# Patient Record
Sex: FEMALE | Race: AMERICAN INDIAN OR ALASKA NATIVE
[De-identification: names, ages, dates, MRNs, and addresses within clinical notes are randomized per-mention and may not be internally consistent; named-entity substitution may affect disease eponyms.]

---

## 2018-02-14 ENCOUNTER — HOSPITAL ENCOUNTER (OUTPATIENT)
Dept: HOSPITAL 5 - LABHHL | Age: 48
Discharge: HOME | End: 2018-02-14
Attending: SPECIALIST
Payer: COMMERCIAL

## 2018-02-14 DIAGNOSIS — N60.02: Primary | ICD-10-CM

## 2018-02-14 PROCEDURE — 88112 CYTOPATH CELL ENHANCE TECH: CPT

## 2020-11-23 ENCOUNTER — OFFICE VISIT (OUTPATIENT)
Dept: URBAN - METROPOLITAN AREA CLINIC 94 | Facility: CLINIC | Age: 50
End: 2020-11-23
Payer: COMMERCIAL

## 2020-11-23 VITALS
HEART RATE: 71 BPM | TEMPERATURE: 97.1 F | WEIGHT: 185 LBS | BODY MASS INDEX: 32.78 KG/M2 | SYSTOLIC BLOOD PRESSURE: 163 MMHG | DIASTOLIC BLOOD PRESSURE: 85 MMHG | HEIGHT: 63 IN

## 2020-11-23 DIAGNOSIS — Z12.11 COLON CANCER SCREENING: ICD-10-CM

## 2020-11-23 DIAGNOSIS — R10.9 ABDOMINAL PAIN, UNSPECIFIED ABDOMINAL LOCATION: ICD-10-CM

## 2020-11-23 DIAGNOSIS — K59.00 CONSTIPATION, UNSPECIFIED CONSTIPATION TYPE: ICD-10-CM

## 2020-11-23 PROCEDURE — G8427 DOCREV CUR MEDS BY ELIG CLIN: HCPCS | Performed by: INTERNAL MEDICINE

## 2020-11-23 PROCEDURE — 1036F TOBACCO NON-USER: CPT | Performed by: INTERNAL MEDICINE

## 2020-11-23 PROCEDURE — 99204 OFFICE O/P NEW MOD 45 MIN: CPT | Performed by: INTERNAL MEDICINE

## 2020-11-23 PROCEDURE — 3017F COLORECTAL CA SCREEN DOC REV: CPT | Performed by: INTERNAL MEDICINE

## 2020-11-23 RX ORDER — MULTIVIT-MIN/IRON/FOLIC ACID/K 18-600-40
AS DIRECTED CAPSULE ORAL
Status: ACTIVE | COMMUNITY

## 2020-11-23 RX ORDER — BRIMONIDINE TARTRATE 2 MG/ML
1 DROP INTO AFFECTED EYE SOLUTION/ DROPS OPHTHALMIC
Status: ACTIVE | COMMUNITY

## 2020-11-23 RX ORDER — THYROID 60 MG/1
1 TABLET ON AN EMPTY STOMACH TABLET ORAL ONCE A DAY
Status: ACTIVE | COMMUNITY

## 2020-11-23 RX ORDER — ASPIRIN 325 MG
1 CAPSULE TABLET ORAL ONCE A DAY
Status: ACTIVE | COMMUNITY

## 2020-11-23 NOTE — HPI-TODAY'S VISIT:
51 y/o with hx of partial colectomy(for constipation?), hysterectomy, hypothyroidism(on levothyroxine) here for constipation and abdominal pain  Patient reports intermittent non-specific abdominal cramping pain with associated constipation, with symptoms that improve with BM. Has taken linzess recently with some improvement. Does report she had a partial colectomy over 15 years ago for constipation as 'some muscles in the colon wall were not working properly'. Since then did well, until her constipation returned. Does report last colonoscopy over 10 years ago  CT a/p 2016: no acute findings  Denies any blood in stools, nausea/vomiting, diarrhea, family hx of colon cancer

## 2020-12-30 ENCOUNTER — OFFICE VISIT (OUTPATIENT)
Dept: URBAN - METROPOLITAN AREA SURGERY CENTER 17 | Facility: SURGERY CENTER | Age: 50
End: 2020-12-30
Payer: COMMERCIAL

## 2020-12-30 ENCOUNTER — CLAIMS CREATED FROM THE CLAIM WINDOW (OUTPATIENT)
Dept: URBAN - METROPOLITAN AREA CLINIC 4 | Facility: CLINIC | Age: 50
End: 2020-12-30
Payer: COMMERCIAL

## 2020-12-30 DIAGNOSIS — K63.5 BENIGN COLON POLYP: ICD-10-CM

## 2020-12-30 DIAGNOSIS — Z12.11 COLON CANCER SCREENING: ICD-10-CM

## 2020-12-30 DIAGNOSIS — K63.89 OTHER SPECIFIED DISEASES OF INTESTINE: ICD-10-CM

## 2020-12-30 PROCEDURE — 45380 COLONOSCOPY AND BIOPSY: CPT | Performed by: INTERNAL MEDICINE

## 2020-12-30 PROCEDURE — G9933 CANC DETECTD DURING COL SCRN: HCPCS | Performed by: INTERNAL MEDICINE

## 2020-12-30 PROCEDURE — 88305 TISSUE EXAM BY PATHOLOGIST: CPT | Performed by: PATHOLOGY

## 2020-12-30 PROCEDURE — G8907 PT DOC NO EVENTS ON DISCHARG: HCPCS | Performed by: INTERNAL MEDICINE

## 2020-12-30 RX ORDER — BRIMONIDINE TARTRATE 2 MG/ML
1 DROP INTO AFFECTED EYE SOLUTION/ DROPS OPHTHALMIC
Status: ACTIVE | COMMUNITY

## 2020-12-30 RX ORDER — ASPIRIN 325 MG
1 CAPSULE TABLET ORAL ONCE A DAY
Status: ACTIVE | COMMUNITY

## 2020-12-30 RX ORDER — THYROID 60 MG/1
1 TABLET ON AN EMPTY STOMACH TABLET ORAL ONCE A DAY
Status: ACTIVE | COMMUNITY

## 2020-12-30 RX ORDER — MULTIVIT-MIN/IRON/FOLIC ACID/K 18-600-40
AS DIRECTED CAPSULE ORAL
Status: ACTIVE | COMMUNITY

## 2021-01-21 ENCOUNTER — OFFICE VISIT (OUTPATIENT)
Dept: URBAN - METROPOLITAN AREA CLINIC 94 | Facility: CLINIC | Age: 51
End: 2021-01-21
Payer: COMMERCIAL

## 2021-01-21 ENCOUNTER — WEB ENCOUNTER (OUTPATIENT)
Dept: URBAN - METROPOLITAN AREA CLINIC 94 | Facility: CLINIC | Age: 51
End: 2021-01-21

## 2021-01-21 VITALS
WEIGHT: 189 LBS | TEMPERATURE: 97.3 F | DIASTOLIC BLOOD PRESSURE: 78 MMHG | BODY MASS INDEX: 33.49 KG/M2 | SYSTOLIC BLOOD PRESSURE: 155 MMHG | HEIGHT: 63 IN | HEART RATE: 79 BPM

## 2021-01-21 DIAGNOSIS — R10.9 ABDOMINAL PAIN, UNSPECIFIED ABDOMINAL LOCATION: ICD-10-CM

## 2021-01-21 DIAGNOSIS — K59.00 CONSTIPATION, UNSPECIFIED CONSTIPATION TYPE: ICD-10-CM

## 2021-01-21 PROCEDURE — 99213 OFFICE O/P EST LOW 20 MIN: CPT | Performed by: INTERNAL MEDICINE

## 2021-01-21 PROCEDURE — G8427 DOCREV CUR MEDS BY ELIG CLIN: HCPCS | Performed by: INTERNAL MEDICINE

## 2021-01-21 PROCEDURE — G8482 FLU IMMUNIZE ORDER/ADMIN: HCPCS | Performed by: INTERNAL MEDICINE

## 2021-01-21 PROCEDURE — 3017F COLORECTAL CA SCREEN DOC REV: CPT | Performed by: INTERNAL MEDICINE

## 2021-01-21 RX ORDER — MULTIVIT-MIN/IRON/FOLIC ACID/K 18-600-40
AS DIRECTED CAPSULE ORAL
Status: ACTIVE | COMMUNITY

## 2021-01-21 RX ORDER — THYROID 60 MG/1
1 TABLET ON AN EMPTY STOMACH TABLET ORAL ONCE A DAY
Status: ACTIVE | COMMUNITY

## 2021-01-21 RX ORDER — ASPIRIN 325 MG
1 CAPSULE TABLET ORAL ONCE A DAY
Status: ACTIVE | COMMUNITY

## 2021-01-21 RX ORDER — BRIMONIDINE TARTRATE 2 MG/ML
1 DROP INTO AFFECTED EYE SOLUTION/ DROPS OPHTHALMIC
Status: ACTIVE | COMMUNITY

## 2021-01-21 NOTE — HPI-TODAY'S VISIT:
51 y/o with hx of partial colectomy(for constipation per patient), hypothyroidism(on levothyroxine) here for f/u of constipation and abdominal pain  Patient with intermittent non-specific abdominal cramping pain with associated constipation, with symptoms that improve with BM. Does report she had a partial colectomy over 15 years ago for constipation as 'some muscles in the colon wall were not working properly'.  Started on linzess, miralax, fiber. Underwent colonoscopy last month unremarkable other than colo-colonic anastomosis  Today reports almost near resolution of her symptoms on the daily miralax, fiber, prunes and linzess. Has a daily BM sometimes soft, somtimes firm but no straining  CT a/p 2016: no acute findings

## 2021-05-28 ENCOUNTER — OFFICE VISIT (OUTPATIENT)
Dept: URBAN - METROPOLITAN AREA CLINIC 94 | Facility: CLINIC | Age: 51
End: 2021-05-28

## 2021-06-01 ENCOUNTER — OFFICE VISIT (OUTPATIENT)
Dept: URBAN - METROPOLITAN AREA CLINIC 94 | Facility: CLINIC | Age: 51
End: 2021-06-01
Payer: COMMERCIAL

## 2021-06-01 VITALS
BODY MASS INDEX: 33.66 KG/M2 | HEIGHT: 63 IN | DIASTOLIC BLOOD PRESSURE: 85 MMHG | SYSTOLIC BLOOD PRESSURE: 166 MMHG | HEART RATE: 72 BPM | WEIGHT: 190 LBS | TEMPERATURE: 97.5 F

## 2021-06-01 DIAGNOSIS — K59.00 CONSTIPATION, UNSPECIFIED CONSTIPATION TYPE: ICD-10-CM

## 2021-06-01 DIAGNOSIS — R06.02 SOB (SHORTNESS OF BREATH): ICD-10-CM

## 2021-06-01 DIAGNOSIS — R07.9 CHEST PAIN AT REST: ICD-10-CM

## 2021-06-01 PROCEDURE — 99214 OFFICE O/P EST MOD 30 MIN: CPT | Performed by: INTERNAL MEDICINE

## 2021-06-01 RX ORDER — BRIMONIDINE TARTRATE 2 MG/ML
1 DROP INTO AFFECTED EYE SOLUTION/ DROPS OPHTHALMIC
Status: ACTIVE | COMMUNITY

## 2021-06-01 RX ORDER — ALUMINUM HYDROXIDE AND MAGNESIUM CARBONATE 95; 358 MG/15ML; MG/15ML
15 ML AFTER MEALS AND AT BEDTIME AS NEEDED LIQUID ORAL
Qty: 1800 ML | Refills: 2 | OUTPATIENT
Start: 2021-06-01 | End: 2021-08-30

## 2021-06-01 RX ORDER — THYROID 60 MG/1
1 TABLET ON AN EMPTY STOMACH TABLET ORAL ONCE A DAY
Status: ACTIVE | COMMUNITY

## 2021-06-01 RX ORDER — OMEPRAZOLE 40 MG/1
1 CAPSULE 30 MINUTES BEFORE MORNING MEAL CAPSULE, DELAYED RELEASE ORAL ONCE A DAY
Qty: 60 | Refills: 2 | OUTPATIENT
Start: 2021-06-01

## 2021-06-01 RX ORDER — MULTIVIT-MIN/IRON/FOLIC ACID/K 18-600-40
AS DIRECTED CAPSULE ORAL
Status: ON HOLD | COMMUNITY

## 2021-06-01 RX ORDER — ASPIRIN 325 MG
1 CAPSULE TABLET ORAL ONCE A DAY
Status: ON HOLD | COMMUNITY

## 2021-06-01 NOTE — PHYSICAL EXAM CHEST:
no lesions, mild lower chest tenderness, no traumatic injuries,  no significant scars are present,  chest wall non-tender,  no masses present, breathing is unlabored without accessory muscle use,normal breath sounds

## 2021-06-01 NOTE — HPI-TODAY'S VISIT:
49 y/o with hx of partial colectomy(for constipation per patient), hypothyroidism(on levothyroxine) here for chest pressure, SOB  Patient reports was doing well however since April has had 5 episodes of chest pressure/pain, lasting an hour. Has SOB almost daily whenver she exerts herself. Occasional cough. Worried about a heartattack. Saw PCP with EKG nromal. Referred to see cardiac with appointment in next week. Came in today incase if it's GI related  Denies any recent viral illness, heartburn, dysphagia, abdominal pain, weight loss  Previiousy seen for constipation, with colonoscopy 12/2020 unremarkable other than colo-colonic anastomosis.   Well controlled on linzess, miralax, fiber with resolution of symptoms.   CT a/p 2016: no acute findings

## 2021-07-02 ENCOUNTER — OFFICE VISIT (OUTPATIENT)
Dept: URBAN - METROPOLITAN AREA CLINIC 94 | Facility: CLINIC | Age: 51
End: 2021-07-02

## 2021-07-02 ENCOUNTER — OFFICE VISIT (OUTPATIENT)
Dept: URBAN - METROPOLITAN AREA CLINIC 94 | Facility: CLINIC | Age: 51
End: 2021-07-02
Payer: COMMERCIAL

## 2021-07-02 VITALS
HEIGHT: 63 IN | HEART RATE: 59 BPM | TEMPERATURE: 97.7 F | DIASTOLIC BLOOD PRESSURE: 83 MMHG | SYSTOLIC BLOOD PRESSURE: 147 MMHG | BODY MASS INDEX: 33.66 KG/M2 | WEIGHT: 190 LBS

## 2021-07-02 DIAGNOSIS — K59.00 CONSTIPATION, UNSPECIFIED CONSTIPATION TYPE: ICD-10-CM

## 2021-07-02 DIAGNOSIS — R07.9 CHEST PAIN AT REST: ICD-10-CM

## 2021-07-02 DIAGNOSIS — R06.02 SOB (SHORTNESS OF BREATH): ICD-10-CM

## 2021-07-02 PROCEDURE — 99214 OFFICE O/P EST MOD 30 MIN: CPT | Performed by: INTERNAL MEDICINE

## 2021-07-02 RX ORDER — ASPIRIN 325 MG
1 CAPSULE TABLET ORAL ONCE A DAY
Status: ON HOLD | COMMUNITY

## 2021-07-02 RX ORDER — OMEPRAZOLE 40 MG/1
1 CAPSULE 30 MINUTES BEFORE MORNING MEAL CAPSULE, DELAYED RELEASE ORAL ONCE A DAY
Qty: 60 | Refills: 2 | Status: ACTIVE | COMMUNITY
Start: 2021-06-01

## 2021-07-02 RX ORDER — THYROID 60 MG/1
1 TABLET ON AN EMPTY STOMACH TABLET ORAL ONCE A DAY
Status: ACTIVE | COMMUNITY

## 2021-07-02 RX ORDER — BRIMONIDINE TARTRATE 2 MG/ML
1 DROP INTO AFFECTED EYE SOLUTION/ DROPS OPHTHALMIC
Status: ACTIVE | COMMUNITY

## 2021-07-02 RX ORDER — MULTIVIT-MIN/IRON/FOLIC ACID/K 18-600-40
AS DIRECTED CAPSULE ORAL
Status: ON HOLD | COMMUNITY

## 2021-07-02 RX ORDER — ALUMINUM HYDROXIDE AND MAGNESIUM CARBONATE 95; 358 MG/15ML; MG/15ML
15 ML AFTER MEALS AND AT BEDTIME AS NEEDED LIQUID ORAL
Qty: 1800 ML | Refills: 2 | Status: ACTIVE | COMMUNITY
Start: 2021-06-01 | End: 2021-08-30

## 2021-07-02 NOTE — HPI-TODAY'S VISIT:
51 y/o with hx of partial colectomy(for constipation per patient), hypothyroidism(on levothyroxine) here for f/u of chest pressure, SOB  Patient reports was doing well however since April has had multiple episodes of chest pressure/pain, lasting an hour. Has SOB almost daily whenver she exerts herself. Occasional cough. EKG at PCP normal. Her cards since last visit, and is waiting for results of her stress test and TTE  Still has symptoms. No difference with PPI and antacoid.   Denies any recent viral illness, heartburn, dysphagia, abdominal pain, weight loss  Previiousy seen for constipation, with colonoscopy 12/2020 unremarkable other than colo-colonic anastomosis.   Well controlled on linzess, miralax, fiber with resolution of symptoms.  CT a/p 2016: no acute findings

## 2021-07-12 ENCOUNTER — OFFICE VISIT (OUTPATIENT)
Dept: URBAN - METROPOLITAN AREA SURGERY CENTER 17 | Facility: SURGERY CENTER | Age: 51
End: 2021-07-12
Payer: COMMERCIAL

## 2021-07-12 ENCOUNTER — CLAIMS CREATED FROM THE CLAIM WINDOW (OUTPATIENT)
Dept: URBAN - METROPOLITAN AREA CLINIC 4 | Facility: CLINIC | Age: 51
End: 2021-07-12
Payer: COMMERCIAL

## 2021-07-12 DIAGNOSIS — K21.9 GASTRO-ESOPHAGEAL REFLUX DISEASE WITHOUT ESOPHAGITIS: ICD-10-CM

## 2021-07-12 DIAGNOSIS — K31.89 GASTRIC FOVEOLAR HYPERPLASIA: ICD-10-CM

## 2021-07-12 DIAGNOSIS — K21.9 ACID REFLUX: ICD-10-CM

## 2021-07-12 DIAGNOSIS — K31.89 ACQUIRED DEFORMITY OF DUODENUM: ICD-10-CM

## 2021-07-12 PROCEDURE — 88312 SPECIAL STAINS GROUP 1: CPT | Performed by: PATHOLOGY

## 2021-07-12 PROCEDURE — 88305 TISSUE EXAM BY PATHOLOGIST: CPT | Performed by: PATHOLOGY

## 2021-07-12 PROCEDURE — G8907 PT DOC NO EVENTS ON DISCHARG: HCPCS | Performed by: INTERNAL MEDICINE

## 2021-07-12 PROCEDURE — 43239 EGD BIOPSY SINGLE/MULTIPLE: CPT | Performed by: INTERNAL MEDICINE

## 2021-07-12 RX ORDER — ASPIRIN 325 MG
1 CAPSULE TABLET ORAL ONCE A DAY
Status: ON HOLD | COMMUNITY

## 2021-07-12 RX ORDER — MULTIVIT-MIN/IRON/FOLIC ACID/K 18-600-40
AS DIRECTED CAPSULE ORAL
Status: ON HOLD | COMMUNITY

## 2021-07-12 RX ORDER — ALUMINUM HYDROXIDE AND MAGNESIUM CARBONATE 95; 358 MG/15ML; MG/15ML
15 ML AFTER MEALS AND AT BEDTIME AS NEEDED LIQUID ORAL
Qty: 1800 ML | Refills: 2 | Status: ACTIVE | COMMUNITY
Start: 2021-06-01 | End: 2021-08-30

## 2021-07-12 RX ORDER — THYROID 60 MG/1
1 TABLET ON AN EMPTY STOMACH TABLET ORAL ONCE A DAY
Status: ACTIVE | COMMUNITY

## 2021-07-12 RX ORDER — BRIMONIDINE TARTRATE 2 MG/ML
1 DROP INTO AFFECTED EYE SOLUTION/ DROPS OPHTHALMIC
Status: ACTIVE | COMMUNITY

## 2021-07-12 RX ORDER — OMEPRAZOLE 40 MG/1
1 CAPSULE 30 MINUTES BEFORE MORNING MEAL CAPSULE, DELAYED RELEASE ORAL ONCE A DAY
Qty: 60 | Refills: 2 | Status: ACTIVE | COMMUNITY
Start: 2021-06-01

## 2021-07-27 ENCOUNTER — OFFICE VISIT (OUTPATIENT)
Dept: URBAN - METROPOLITAN AREA CLINIC 94 | Facility: CLINIC | Age: 51
End: 2021-07-27
Payer: COMMERCIAL

## 2021-07-27 VITALS
SYSTOLIC BLOOD PRESSURE: 125 MMHG | DIASTOLIC BLOOD PRESSURE: 80 MMHG | BODY MASS INDEX: 33.49 KG/M2 | HEIGHT: 63 IN | HEART RATE: 82 BPM | TEMPERATURE: 98.1 F | WEIGHT: 189 LBS

## 2021-07-27 DIAGNOSIS — R06.02 SOB (SHORTNESS OF BREATH): ICD-10-CM

## 2021-07-27 DIAGNOSIS — K59.00 CONSTIPATION, UNSPECIFIED CONSTIPATION TYPE: ICD-10-CM

## 2021-07-27 PROBLEM — 14760008: Status: ACTIVE | Noted: 2020-11-23

## 2021-07-27 PROCEDURE — 99214 OFFICE O/P EST MOD 30 MIN: CPT | Performed by: INTERNAL MEDICINE

## 2021-07-27 RX ORDER — BRIMONIDINE TARTRATE 2 MG/ML
1 DROP INTO AFFECTED EYE SOLUTION/ DROPS OPHTHALMIC
Status: ACTIVE | COMMUNITY

## 2021-07-27 RX ORDER — OMEPRAZOLE 40 MG/1
1 CAPSULE 30 MINUTES BEFORE MORNING MEAL CAPSULE, DELAYED RELEASE ORAL ONCE A DAY
Qty: 60 | Refills: 2 | Status: ACTIVE | COMMUNITY
Start: 2021-06-01

## 2021-07-27 RX ORDER — MULTIVIT-MIN/IRON/FOLIC ACID/K 18-600-40
AS DIRECTED CAPSULE ORAL
Status: ON HOLD | COMMUNITY

## 2021-07-27 RX ORDER — THYROID 60 MG/1
1 TABLET ON AN EMPTY STOMACH TABLET ORAL ONCE A DAY
Status: ACTIVE | COMMUNITY

## 2021-07-27 RX ORDER — ASPIRIN 325 MG
1 CAPSULE TABLET ORAL ONCE A DAY
Status: ON HOLD | COMMUNITY

## 2021-07-27 RX ORDER — ALUMINUM HYDROXIDE AND MAGNESIUM CARBONATE 95; 358 MG/15ML; MG/15ML
15 ML AFTER MEALS AND AT BEDTIME AS NEEDED LIQUID ORAL
Qty: 1800 ML | Refills: 2 | Status: ON HOLD | COMMUNITY
Start: 2021-06-01 | End: 2021-08-30

## 2021-07-27 NOTE — HPI-TODAY'S VISIT:
49 y/o with hx of partial colectomy(for constipation per patient), hypothyroidism(on levothyroxine) here for f/u of chest pressure, SOB after recent EGD  Patient was doing well however since April has had multiple episodes of chest pressure/pain, lasting an hour. Has SOB almost daily whenver she exerts herself. Occasional cough. EKG at PCP normal, and stress test and TTE normal with cardiology(per patient). Started on omeprazole. EGD this July unremarkable other than reflux esophagitis on biopsy.   Today reports has been chest pain free for over 2 weeks, though still has the daily SOB on exertion. Is taking daily omeprazole. Is not sure if it helps  Is supposed to get a BM biopsy for thrombocytopenia, leukopenia  Previously seen for constipation, with colonoscopy 12/2020 unremarkable other than colo-colonic anastomosis.   Well controlled on linzess, miralax, fiber with resolution of symptoms.  EGD 07/2021: Normal. Biopsy with reflux esophagitis. No h.pylori CLS 12/2020: [place]-colonic anastomosis otherwise normal CT a/p 2016: no acute findings

## 2024-04-30 ENCOUNTER — OV NP (OUTPATIENT)
Dept: URBAN - METROPOLITAN AREA CLINIC 94 | Facility: CLINIC | Age: 54
End: 2024-04-30

## 2024-04-30 ENCOUNTER — LAB (OUTPATIENT)
Dept: URBAN - METROPOLITAN AREA CLINIC 94 | Facility: CLINIC | Age: 54
End: 2024-04-30

## 2024-04-30 VITALS
SYSTOLIC BLOOD PRESSURE: 138 MMHG | WEIGHT: 178 LBS | TEMPERATURE: 97 F | HEART RATE: 72 BPM | DIASTOLIC BLOOD PRESSURE: 83 MMHG | HEIGHT: 63 IN | BODY MASS INDEX: 31.54 KG/M2

## 2024-04-30 RX ORDER — POLYETHYLENE GLYCOL-3350 AND ELECTROLYTES WITH FLAVOR PACK 240; 5.84; 2.98; 6.72; 22.72 G/278.26G; G/278.26G; G/278.26G; G/278.26G; G/278.26G
4000 MILLILITERS POWDER, FOR SOLUTION ORAL
Qty: 4000 MILLILITER | Refills: 0 | OUTPATIENT
Start: 2024-04-30 | End: 2024-05-01

## 2024-04-30 RX ORDER — BRIMONIDINE TARTRATE 2 MG/MG
1 DROP INTO AFFECTED EYE SOLUTION/ DROPS OPHTHALMIC
Status: ACTIVE | COMMUNITY

## 2024-04-30 RX ORDER — SEMAGLUTIDE 1.7 MG/.75ML
0.75 ML INJECTION, SOLUTION SUBCUTANEOUS
Status: ACTIVE | COMMUNITY

## 2024-04-30 RX ORDER — THYROID 60 MG/1
1 TABLET ON AN EMPTY STOMACH TABLET ORAL ONCE A DAY
Status: ACTIVE | COMMUNITY

## 2024-04-30 RX ORDER — LATANOPROST 50 UG/ML
1 DROP INTO AFFECTED EYE IN THE EVENING SOLUTION/ DROPS OPHTHALMIC ONCE A DAY
Status: ACTIVE | COMMUNITY

## 2024-04-30 RX ORDER — ASPIRIN 325 MG
1 CAPSULE TABLET ORAL ONCE A DAY
Status: ACTIVE | COMMUNITY

## 2024-04-30 RX ORDER — LINACLOTIDE 290 UG/1
1 CAPSULE AT LEAST 30 MINUTES BEFORE THE FIRST MEAL OF THE DAY ON AN EMPTY STOMACH CAPSULE, GELATIN COATED ORAL ONCE A DAY
Status: ACTIVE | COMMUNITY

## 2024-04-30 NOTE — HPI-TODAY'S VISIT:
55 y/o F hx of partial colectomy 2/2 chronic constipation presents for eval of abd pain, n/v, diarrhea.   Reports apprx 6 weeks ago she has experienced change in bowel habit accompanied by n/v. Prev struggled with chronic constipation well responsive to linzess but now experiences diarrhea with fecal incontinence, urgency. Reports stools are either watery or loose with no recent formed stools. Denies melena, hematochezia, mucus in stools. Is still currently taking Wegovy (June 2023) and Linzess 290. Nausea is daily with vomiting apprx 2x/wk. Has experienced occasional reflux, denies dysphagia, epigastric pain. Attempted dietary modifications with little sx relief.   No fever, chills, No recent sick contacts, abx use.   CT a/p 04/2024: ? of minimal focal wall thickening or inflammatory change in the sigmoid colon versus incomplete distention. Prior near-total colectomy Tiny anterior abdominal wall hernia containing a portion of nonobstructed small bowel, just lateral to the umbilicus with a small umbilical hernia containing only fat EGD 07/2021: Normal. Biopsy with reflux esophagitis. No h.pylori CLS 12/2020: [place]-colonic anastomosis otherwise normal CT a/p 2016: no acute findings

## 2024-05-15 ENCOUNTER — LAB OUTSIDE AN ENCOUNTER (OUTPATIENT)
Dept: URBAN - METROPOLITAN AREA CLINIC 94 | Facility: CLINIC | Age: 54
End: 2024-05-15

## 2024-05-16 ENCOUNTER — LAB OUTSIDE AN ENCOUNTER (OUTPATIENT)
Dept: URBAN - METROPOLITAN AREA CLINIC 94 | Facility: CLINIC | Age: 54
End: 2024-05-16

## 2024-05-16 ENCOUNTER — TELEPHONE ENCOUNTER (OUTPATIENT)
Dept: URBAN - METROPOLITAN AREA CLINIC 94 | Facility: CLINIC | Age: 54
End: 2024-05-16

## 2024-05-21 ENCOUNTER — TELEPHONE ENCOUNTER (OUTPATIENT)
Dept: URBAN - METROPOLITAN AREA CLINIC 94 | Facility: CLINIC | Age: 54
End: 2024-05-21

## 2024-05-21 LAB
ADENOVIRUS F 40/41: NOT DETECTED
C. DIFFICILE TOXIN A/B, STOOL - QDX: NEGATIVE
CAMPYLOBACTER: NOT DETECTED
CLOSTRIDIUM DIFFICILE: DETECTED
ENTAMOEBA HISTOLYTICA: NOT DETECTED
ENTEROAGGREGATIVE E.COLI: NOT DETECTED
ENTEROTOXIGENIC E.COLI: NOT DETECTED
ESCHERICHIA COLI O157: NOT DETECTED
GIARDIA LAMBLIA: NOT DETECTED
NOROVIRUS GI/GII: NOT DETECTED
ROTAVIRUS A: NOT DETECTED
SALMONELLA SPP.: NOT DETECTED
SHIGA-LIKE TOXIN PRODUCING E.COLI: NOT DETECTED
SHIGELLA SPP. / ENTEROINVASIVE E.COLI: NOT DETECTED
VIBRIO PARAHAEMOLYTICUS: NOT DETECTED
VIBRIO SPP.: NOT DETECTED
YERSINIA ENTEROCOLITICA: NOT DETECTED

## 2024-05-22 ENCOUNTER — TELEPHONE ENCOUNTER (OUTPATIENT)
Dept: URBAN - METROPOLITAN AREA CLINIC 94 | Facility: CLINIC | Age: 54
End: 2024-05-22

## 2024-05-22 ENCOUNTER — LAB OUTSIDE AN ENCOUNTER (OUTPATIENT)
Dept: URBAN - METROPOLITAN AREA CLINIC 94 | Facility: CLINIC | Age: 54
End: 2024-05-22

## 2024-06-06 ENCOUNTER — OFFICE VISIT (OUTPATIENT)
Dept: URBAN - METROPOLITAN AREA SURGERY CENTER 17 | Facility: SURGERY CENTER | Age: 54
End: 2024-06-06
Payer: COMMERCIAL

## 2024-06-06 ENCOUNTER — CLAIMS CREATED FROM THE CLAIM WINDOW (OUTPATIENT)
Dept: URBAN - METROPOLITAN AREA CLINIC 4 | Facility: CLINIC | Age: 54
End: 2024-06-06
Payer: COMMERCIAL

## 2024-06-06 DIAGNOSIS — D50.9 ANEMIA, IRON DEFICIENCY: ICD-10-CM

## 2024-06-06 DIAGNOSIS — K63.89 OTHER SPECIFIED DISEASES OF INTESTINE: ICD-10-CM

## 2024-06-06 DIAGNOSIS — Z12.11 COLON CANCER SCREENING (HIGH RISK): ICD-10-CM

## 2024-06-06 DIAGNOSIS — R19.7 DIARRHEA: ICD-10-CM

## 2024-06-06 DIAGNOSIS — R19.7 ACUTE DIARRHEA: ICD-10-CM

## 2024-06-06 DIAGNOSIS — Z86.010 ADENOMAS PERSONAL HISTORY OF COLONIC POLYPS: ICD-10-CM

## 2024-06-06 DIAGNOSIS — R93.3 ABN FINDINGS-GI TRACT: ICD-10-CM

## 2024-06-06 PROCEDURE — 88342 IMHCHEM/IMCYTCHM 1ST ANTB: CPT | Performed by: PATHOLOGY

## 2024-06-06 PROCEDURE — 00812 ANES LWR INTST SCR COLSC: CPT | Performed by: NURSE ANESTHETIST, CERTIFIED REGISTERED

## 2024-06-06 PROCEDURE — 88313 SPECIAL STAINS GROUP 2: CPT | Performed by: PATHOLOGY

## 2024-06-06 PROCEDURE — 45380 COLONOSCOPY AND BIOPSY: CPT | Performed by: INTERNAL MEDICINE

## 2024-06-06 PROCEDURE — 88305 TISSUE EXAM BY PATHOLOGIST: CPT | Performed by: PATHOLOGY

## 2024-06-06 PROCEDURE — G8907 PT DOC NO EVENTS ON DISCHARG: HCPCS | Performed by: INTERNAL MEDICINE

## 2024-06-06 RX ORDER — MULTIVIT-MIN/IRON/FOLIC ACID/K 18-600-40
AS DIRECTED CAPSULE ORAL
Status: ON HOLD | COMMUNITY

## 2024-06-06 RX ORDER — BRIMONIDINE TARTRATE 2 MG/MG
1 DROP INTO AFFECTED EYE SOLUTION/ DROPS OPHTHALMIC
Status: ACTIVE | COMMUNITY

## 2024-06-06 RX ORDER — THYROID 60 MG/1
1 TABLET ON AN EMPTY STOMACH TABLET ORAL ONCE A DAY
Status: ACTIVE | COMMUNITY

## 2024-06-06 RX ORDER — LINACLOTIDE 290 UG/1
1 CAPSULE AT LEAST 30 MINUTES BEFORE THE FIRST MEAL OF THE DAY ON AN EMPTY STOMACH CAPSULE, GELATIN COATED ORAL ONCE A DAY
Status: ACTIVE | COMMUNITY

## 2024-06-06 RX ORDER — OMEPRAZOLE 40 MG/1
1 CAPSULE 30 MINUTES BEFORE MORNING MEAL CAPSULE, DELAYED RELEASE ORAL ONCE A DAY
Qty: 60 | Refills: 2 | Status: ACTIVE | COMMUNITY
Start: 2021-06-01

## 2024-06-06 RX ORDER — ASPIRIN 325 MG
1 CAPSULE TABLET ORAL ONCE A DAY
Status: ACTIVE | COMMUNITY

## 2024-06-06 RX ORDER — LATANOPROST 50 UG/ML
1 DROP INTO AFFECTED EYE IN THE EVENING SOLUTION/ DROPS OPHTHALMIC ONCE A DAY
Status: ACTIVE | COMMUNITY

## 2024-06-06 RX ORDER — SEMAGLUTIDE 1.7 MG/.75ML
0.75 ML INJECTION, SOLUTION SUBCUTANEOUS
Status: ACTIVE | COMMUNITY

## 2024-06-12 ENCOUNTER — CLAIMS CREATED FROM THE CLAIM WINDOW (OUTPATIENT)
Dept: URBAN - METROPOLITAN AREA SURGERY CENTER 17 | Facility: SURGERY CENTER | Age: 54
End: 2024-06-12
Payer: COMMERCIAL

## 2024-06-12 ENCOUNTER — CLAIMS CREATED FROM THE CLAIM WINDOW (OUTPATIENT)
Dept: URBAN - METROPOLITAN AREA CLINIC 4 | Facility: CLINIC | Age: 54
End: 2024-06-12
Payer: COMMERCIAL

## 2024-06-12 DIAGNOSIS — R11.2 ACUTE NAUSEA WITH NONBILIOUS VOMITING: ICD-10-CM

## 2024-06-12 DIAGNOSIS — K31.89 OTHER DISEASES OF STOMACH AND DUODENUM: ICD-10-CM

## 2024-06-12 DIAGNOSIS — K31.89 MUCOSAL ABNORMALITY OF STOMACH: ICD-10-CM

## 2024-06-12 DIAGNOSIS — K44.9 HIATAL HERNIA: ICD-10-CM

## 2024-06-12 DIAGNOSIS — K29.70 GASTRITIS, UNSPECIFIED, WITHOUT BLEEDING: ICD-10-CM

## 2024-06-12 DIAGNOSIS — R10.13 ABDOMINAL DISCOMFORT, EPIGASTRIC: ICD-10-CM

## 2024-06-12 PROCEDURE — 88305 TISSUE EXAM BY PATHOLOGIST: CPT | Performed by: STUDENT IN AN ORGANIZED HEALTH CARE EDUCATION/TRAINING PROGRAM

## 2024-06-12 PROCEDURE — 43239 EGD BIOPSY SINGLE/MULTIPLE: CPT | Performed by: INTERNAL MEDICINE

## 2024-06-12 PROCEDURE — 00731 ANES UPR GI NDSC PX NOS: CPT | Performed by: NURSE ANESTHETIST, CERTIFIED REGISTERED

## 2024-06-26 ENCOUNTER — OFFICE VISIT (OUTPATIENT)
Dept: URBAN - METROPOLITAN AREA CLINIC 94 | Facility: CLINIC | Age: 54
End: 2024-06-26

## 2024-07-03 ENCOUNTER — TELEPHONE ENCOUNTER (OUTPATIENT)
Dept: URBAN - METROPOLITAN AREA CLINIC 94 | Facility: CLINIC | Age: 54
End: 2024-07-03

## 2024-07-05 ENCOUNTER — OFFICE VISIT (OUTPATIENT)
Dept: URBAN - METROPOLITAN AREA CLINIC 94 | Facility: CLINIC | Age: 54
End: 2024-07-05
Payer: COMMERCIAL

## 2024-07-05 ENCOUNTER — DASHBOARD ENCOUNTERS (OUTPATIENT)
Age: 54
End: 2024-07-05

## 2024-07-05 VITALS
HEART RATE: 90 BPM | DIASTOLIC BLOOD PRESSURE: 75 MMHG | BODY MASS INDEX: 31.18 KG/M2 | WEIGHT: 176 LBS | SYSTOLIC BLOOD PRESSURE: 111 MMHG | HEIGHT: 63 IN | TEMPERATURE: 98.1 F

## 2024-07-05 DIAGNOSIS — K59.09 CHANGE IN BOWEL MOVEMENTS INTERMITTENT CONSTIPATION. URGENCY IN THE MORNING.: ICD-10-CM

## 2024-07-05 DIAGNOSIS — K76.9 LIVER LESION: ICD-10-CM

## 2024-07-05 PROBLEM — 300331000: Status: ACTIVE | Noted: 2024-07-05

## 2024-07-05 PROCEDURE — 99214 OFFICE O/P EST MOD 30 MIN: CPT | Performed by: INTERNAL MEDICINE

## 2024-07-05 RX ORDER — ASPIRIN 325 MG
1 CAPSULE TABLET ORAL ONCE A DAY
Status: ACTIVE | COMMUNITY

## 2024-07-05 RX ORDER — OMEPRAZOLE 40 MG/1
1 CAPSULE 30 MINUTES BEFORE MORNING MEAL CAPSULE, DELAYED RELEASE ORAL ONCE A DAY
Qty: 60 | Refills: 2 | Status: ON HOLD | COMMUNITY
Start: 2021-06-01

## 2024-07-05 RX ORDER — BRIMONIDINE TARTRATE 2 MG/MG
1 DROP INTO AFFECTED EYE SOLUTION/ DROPS OPHTHALMIC
Status: ACTIVE | COMMUNITY

## 2024-07-05 RX ORDER — LINACLOTIDE 145 UG/1
1 CAPSULE AT LEAST 30 MINUTES BEFORE THE FIRST MEAL OF THE DAY ON AN EMPTY STOMACH CAPSULE, GELATIN COATED ORAL ONCE A DAY
Qty: 90 | Refills: 3 | OUTPATIENT
Start: 2024-07-05 | End: 2025-06-30

## 2024-07-05 RX ORDER — THYROID 60 MG/1
1 TABLET ON AN EMPTY STOMACH TABLET ORAL ONCE A DAY
Status: ACTIVE | COMMUNITY

## 2024-07-05 RX ORDER — LINACLOTIDE 290 UG/1
1 CAPSULE AT LEAST 30 MINUTES BEFORE THE FIRST MEAL OF THE DAY ON AN EMPTY STOMACH CAPSULE, GELATIN COATED ORAL ONCE A DAY
Status: ON HOLD | COMMUNITY

## 2024-07-05 RX ORDER — LATANOPROST 50 UG/ML
1 DROP INTO AFFECTED EYE IN THE EVENING SOLUTION/ DROPS OPHTHALMIC ONCE A DAY
Status: ACTIVE | COMMUNITY

## 2024-07-05 RX ORDER — SEMAGLUTIDE 1.7 MG/.75ML
0.75 ML INJECTION, SOLUTION SUBCUTANEOUS
Status: ACTIVE | COMMUNITY

## 2024-07-05 NOTE — HPI-TODAY'S VISIT:
53 y/o F hx of partial colectomy 2/2 chronic constipation presents for f/u for abd pain, n/v, diarrhea. Seen by Hayde Sanders last visit  Patient with chronic constipation well responsive to linzess. However, had some change in a few months back with diarrhea with fecal incontinence, urgency. Also nausea/vomiting. Workup stool study positive for C.diff PCR(toxin negative) s/p fidaxomicin. S/p reassuring EGD/CLS. 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 Also had small paraspinal possible hemangioma for which is seeing orthopedics(referred by PCP)   Today reports resolution of prior symptoms. Is taking Wegovy (June 2023).Would like to restart her linzess but at lower dose  WORKUP EGD 06/2024; small HH, non-erosive gastritis. Biopsies gastropathy, no H.pylori CLS 06/2024: patient ileo-colonic anastomosis, normal TI. Biopsies of colon normal MRI a/p Eovist: several small arterial enhancing liver lesions(adenoma vs FNH), small hepatic cysts, small T2 lesion in paraspinal muscle still seen, tiny lesion in left lateral abdomen skin above umbilicis. 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 of minimal focal wall thickening or inflammatory change in the sigmoid colon versus incomplete distention. Prior near-total colectomy Tiny anterior abdominal wall hernia containing a portion of nonobstructed small bowel, just lateral to the umbilicus with a small umbilical hernia containing only fat EGD 07/2021: Normal. Biopsy with reflux esophagitis. No h.pylori CLS 12/2020: [place]-colonic anastomosis otherwise normal CT a/p 2016: no acute findings

## 2024-07-08 ENCOUNTER — TELEPHONE ENCOUNTER (OUTPATIENT)
Dept: URBAN - METROPOLITAN AREA CLINIC 94 | Facility: CLINIC | Age: 54
End: 2024-07-08

## 2024-07-09 ENCOUNTER — OFFICE VISIT (OUTPATIENT)
Dept: URBAN - METROPOLITAN AREA CLINIC 94 | Facility: CLINIC | Age: 54
End: 2024-07-09

## 2024-09-17 ENCOUNTER — TELEPHONE ENCOUNTER (OUTPATIENT)
Dept: URBAN - METROPOLITAN AREA CLINIC 94 | Facility: CLINIC | Age: 54
End: 2024-09-17

## 2024-09-17 RX ORDER — ONDANSETRON HYDROCHLORIDE 4 MG/1
1 TABLET TABLET, FILM COATED ORAL
Qty: 60 | Refills: 1 | OUTPATIENT
Start: 2024-09-17

## 2024-09-26 ENCOUNTER — LAB OUTSIDE AN ENCOUNTER (OUTPATIENT)
Dept: URBAN - METROPOLITAN AREA CLINIC 94 | Facility: CLINIC | Age: 54
End: 2024-09-26

## 2024-09-26 ENCOUNTER — OFFICE VISIT (OUTPATIENT)
Dept: URBAN - METROPOLITAN AREA CLINIC 94 | Facility: CLINIC | Age: 54
End: 2024-09-26
Payer: COMMERCIAL

## 2024-09-26 VITALS
SYSTOLIC BLOOD PRESSURE: 132 MMHG | DIASTOLIC BLOOD PRESSURE: 85 MMHG | BODY MASS INDEX: 30.3 KG/M2 | HEART RATE: 69 BPM | TEMPERATURE: 97.7 F | HEIGHT: 63 IN | WEIGHT: 171 LBS

## 2024-09-26 DIAGNOSIS — R11.2 NAUSEA AND VOMITING, UNSPECIFIED VOMITING TYPE: ICD-10-CM

## 2024-09-26 DIAGNOSIS — K59.04 CHRONIC IDIOPATHIC CONSTIPATION: ICD-10-CM

## 2024-09-26 PROBLEM — 82934008: Status: ACTIVE | Noted: 2024-09-26

## 2024-09-26 PROCEDURE — 99214 OFFICE O/P EST MOD 30 MIN: CPT

## 2024-09-26 RX ORDER — LINACLOTIDE 145 UG/1
1 CAPSULE AT LEAST 30 MINUTES BEFORE THE FIRST MEAL OF THE DAY ON AN EMPTY STOMACH CAPSULE, GELATIN COATED ORAL ONCE A DAY
Qty: 90 | Refills: 3 | Status: ACTIVE | COMMUNITY
Start: 2024-07-05 | End: 2025-06-30

## 2024-09-26 RX ORDER — BRIMONIDINE TARTRATE 2 MG/MG
1 DROP INTO AFFECTED EYE SOLUTION/ DROPS OPHTHALMIC
Status: ACTIVE | COMMUNITY

## 2024-09-26 RX ORDER — LATANOPROST 50 UG/ML
1 DROP INTO AFFECTED EYE IN THE EVENING SOLUTION/ DROPS OPHTHALMIC ONCE A DAY
Status: ACTIVE | COMMUNITY

## 2024-09-26 RX ORDER — OMEPRAZOLE 40 MG/1
1 CAPSULE 30 MINUTES BEFORE MORNING MEAL CAPSULE, DELAYED RELEASE ORAL ONCE A DAY
Qty: 60 | Refills: 2 | Status: ON HOLD | COMMUNITY
Start: 2021-06-01

## 2024-09-26 RX ORDER — SEMAGLUTIDE 1.7 MG/.75ML
0.75 ML INJECTION, SOLUTION SUBCUTANEOUS
Status: ON HOLD | COMMUNITY

## 2024-09-26 RX ORDER — ONDANSETRON HYDROCHLORIDE 4 MG/1
1 TABLET TABLET, FILM COATED ORAL
Qty: 60 | Refills: 1 | Status: ACTIVE | COMMUNITY
Start: 2024-09-17

## 2024-09-26 RX ORDER — ASPIRIN 325 MG
1 CAPSULE TABLET ORAL ONCE A DAY
Status: ACTIVE | COMMUNITY

## 2024-09-26 RX ORDER — THYROID 60 MG/1
1 TABLET ON AN EMPTY STOMACH TABLET ORAL ONCE A DAY
Status: ACTIVE | COMMUNITY

## 2024-09-26 NOTE — HPI-TODAY'S VISIT:
----- Message from Hossein Bess [place] call   ---------------------------------------------------------------------------  --------------  [id number] [employer]  [phone]; OK to leave message on voicemail  ---------------------------------------------------------------------------  --------------  SCRIPT ANSWERS  COVID Screen: Alma Shields 53 y/o F hx of partial colectomy 2/2 chronic constipation presents for f/u for abd pain, n/v, diarrhea.   Pt presents today d/t recurrence of n/v episodes accompanied by worsening constipation. Overall, sx initially began in April of this year, started Wegovy in Jan of same year. Episodes include chronic nausea with intermittent vomiting for few days up to over a week. Some accompanied upper abd pain. Reports constipation appears to be worsening during this flares. Was on Linzess 290 - was experiencing significant constipation with overflow diarrhea then dropped to Linzess 145. Admits constipation has worsened since 145 dosing and notes abdominal bloating. Does note cyclical n/v episodes appear to be triggered by food but unable to ID types. Has stopped Wegovy recently in last few days.   Workup stool study positive for C.diff PCR(toxin negative) s/p fidaxomicin. S/p reassuring EGD/CLS. 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 Also had small paraspinal possible hemangioma for which is seeing orthopedics(referred by PCP)   WORKUP EGD 06/2024; small HH, non-erosive gastritis. Biopsies gastropathy, no H.pylori CLS 06/2024: patient ileo-colonic anastomosis, normal TI. Biopsies of colon normal MRI a/p Eovist: several small arterial enhancing liver lesions(adenoma vs FNH), small hepatic cysts, small T2 lesion in paraspinal muscle still seen, tiny lesion in left lateral abdomen skin above umbilicis. 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 of minimal focal wall thickening or inflammatory change in the sigmoid colon versus incomplete distention. Prior near-total colectomy Tiny anterior abdominal wall hernia containing a portion of nonobstructed small bowel, just lateral to the umbilicus with a small umbilical hernia containing only fat EGD 07/2021: Normal. Biopsy with reflux esophagitis. No h.pylori CLS 12/2020: [place]-colonic anastomosis otherwise normal CT a/p 2016: no acute findings

## 2024-10-07 ENCOUNTER — TELEPHONE ENCOUNTER (OUTPATIENT)
Dept: URBAN - METROPOLITAN AREA CLINIC 94 | Facility: CLINIC | Age: 54
End: 2024-10-07

## 2024-10-07 RX ORDER — PLECANATIDE 3 MG/1
1 TABLET TABLET ORAL ONCE A DAY
Qty: 90 TABLET | Refills: 3 | OUTPATIENT
Start: 2024-10-07 | End: 2025-10-02

## 2024-10-14 ENCOUNTER — WEB ENCOUNTER (OUTPATIENT)
Dept: URBAN - METROPOLITAN AREA CLINIC 94 | Facility: CLINIC | Age: 54
End: 2024-10-14

## 2024-12-31 ENCOUNTER — WEB ENCOUNTER (OUTPATIENT)
Dept: URBAN - METROPOLITAN AREA CLINIC 94 | Facility: CLINIC | Age: 54
End: 2024-12-31

## 2025-01-03 ENCOUNTER — OFFICE VISIT (OUTPATIENT)
Dept: URBAN - METROPOLITAN AREA CLINIC 94 | Facility: CLINIC | Age: 55
End: 2025-01-03